# Patient Record
Sex: MALE | Race: WHITE | Employment: FULL TIME | URBAN - METROPOLITAN AREA
[De-identification: names, ages, dates, MRNs, and addresses within clinical notes are randomized per-mention and may not be internally consistent; named-entity substitution may affect disease eponyms.]

---

## 2018-09-30 ENCOUNTER — HOSPITAL ENCOUNTER (EMERGENCY)
Age: 23
Discharge: HOME | End: 2018-09-30
Attending: EMERGENCY MEDICINE
Payer: COMMERCIAL

## 2018-09-30 ENCOUNTER — APPOINTMENT (OUTPATIENT)
Dept: GENERAL RADIOLOGY | Age: 23
End: 2018-09-30
Attending: EMERGENCY MEDICINE
Payer: COMMERCIAL

## 2018-09-30 VITALS
TEMPERATURE: 99.1 F | RESPIRATION RATE: 22 BRPM | DIASTOLIC BLOOD PRESSURE: 63 MMHG | HEART RATE: 89 BPM | OXYGEN SATURATION: 96 % | SYSTOLIC BLOOD PRESSURE: 94 MMHG

## 2018-09-30 DIAGNOSIS — R07.89 ATYPICAL CHEST PAIN: Primary | ICD-10-CM

## 2018-09-30 DIAGNOSIS — R50.9 FEVER, UNSPECIFIED FEVER CAUSE: ICD-10-CM

## 2018-09-30 DIAGNOSIS — R19.7 DIARRHEA, UNSPECIFIED TYPE: ICD-10-CM

## 2018-09-30 DIAGNOSIS — B34.9 VIRAL ILLNESS: ICD-10-CM

## 2018-09-30 DIAGNOSIS — R11.2 NON-INTRACTABLE VOMITING WITH NAUSEA, UNSPECIFIED VOMITING TYPE: ICD-10-CM

## 2018-09-30 LAB
ALBUMIN SERPL-MCNC: 4.6 G/DL (ref 3.5–5)
ALBUMIN/GLOB SERPL: 1.2 {RATIO} (ref 1.1–2.2)
ALP SERPL-CCNC: 77 U/L (ref 45–117)
ALT SERPL-CCNC: 28 U/L (ref 12–78)
ANION GAP SERPL CALC-SCNC: 11 MMOL/L (ref 5–15)
AST SERPL-CCNC: 24 U/L (ref 15–37)
ATRIAL RATE: 107 BPM
BASOPHILS # BLD: 0.2 K/UL (ref 0–0.1)
BASOPHILS NFR BLD: 2 % (ref 0–1)
BILIRUB SERPL-MCNC: 0.6 MG/DL (ref 0.2–1)
BUN SERPL-MCNC: 13 MG/DL (ref 6–20)
BUN/CREAT SERPL: 12 (ref 12–20)
CALCIUM SERPL-MCNC: 9.5 MG/DL (ref 8.5–10.1)
CALCULATED P AXIS, ECG09: 82 DEGREES
CALCULATED R AXIS, ECG10: -89 DEGREES
CALCULATED T AXIS, ECG11: 66 DEGREES
CHLORIDE SERPL-SCNC: 99 MMOL/L (ref 97–108)
CO2 SERPL-SCNC: 28 MMOL/L (ref 21–32)
COMMENT, HOLDF: NORMAL
CREAT SERPL-MCNC: 1.09 MG/DL (ref 0.7–1.3)
D DIMER PPP FEU-MCNC: 0.33 MG/L FEU (ref 0–0.65)
DIAGNOSIS, 93000: NORMAL
DIFFERENTIAL METHOD BLD: ABNORMAL
EOSINOPHIL # BLD: 0 K/UL (ref 0–0.4)
EOSINOPHIL NFR BLD: 0 % (ref 0–7)
ERYTHROCYTE [DISTWIDTH] IN BLOOD BY AUTOMATED COUNT: 12.5 % (ref 11.5–14.5)
GLOBULIN SER CALC-MCNC: 3.8 G/DL (ref 2–4)
GLUCOSE SERPL-MCNC: 108 MG/DL (ref 65–100)
HCT VFR BLD AUTO: 48.1 % (ref 36.6–50.3)
HGB BLD-MCNC: 16.5 G/DL (ref 12.1–17)
IMM GRANULOCYTES # BLD: 0 K/UL
IMM GRANULOCYTES NFR BLD AUTO: 0 %
LYMPHOCYTES # BLD: 0.5 K/UL (ref 0.8–3.5)
LYMPHOCYTES NFR BLD: 5 % (ref 12–49)
MCH RBC QN AUTO: 28.3 PG (ref 26–34)
MCHC RBC AUTO-ENTMCNC: 34.3 G/DL (ref 30–36.5)
MCV RBC AUTO: 82.5 FL (ref 80–99)
MONOCYTES # BLD: 0.4 K/UL (ref 0–1)
MONOCYTES NFR BLD: 4 % (ref 5–13)
NEUTS BAND NFR BLD MANUAL: 8 % (ref 0–6)
NEUTS SEG # BLD: 9.3 K/UL (ref 1.8–8)
NEUTS SEG NFR BLD: 81 % (ref 32–75)
NRBC # BLD: 0 K/UL (ref 0–0.01)
NRBC BLD-RTO: 0 PER 100 WBC
P-R INTERVAL, ECG05: 142 MS
PLATELET # BLD AUTO: 224 K/UL (ref 150–400)
PMV BLD AUTO: 10.3 FL (ref 8.9–12.9)
POTASSIUM SERPL-SCNC: 3.7 MMOL/L (ref 3.5–5.1)
PROT SERPL-MCNC: 8.4 G/DL (ref 6.4–8.2)
Q-T INTERVAL, ECG07: 326 MS
QRS DURATION, ECG06: 106 MS
QTC CALCULATION (BEZET), ECG08: 435 MS
RBC # BLD AUTO: 5.83 M/UL (ref 4.1–5.7)
RBC MORPH BLD: ABNORMAL
SAMPLES BEING HELD,HOLD: NORMAL
SODIUM SERPL-SCNC: 138 MMOL/L (ref 136–145)
TROPONIN I SERPL-MCNC: <0.05 NG/ML
VENTRICULAR RATE, ECG03: 107 BPM
WBC # BLD AUTO: 10.4 K/UL (ref 4.1–11.1)

## 2018-09-30 PROCEDURE — 96361 HYDRATE IV INFUSION ADD-ON: CPT

## 2018-09-30 PROCEDURE — 74011250637 HC RX REV CODE- 250/637: Performed by: EMERGENCY MEDICINE

## 2018-09-30 PROCEDURE — 80053 COMPREHEN METABOLIC PANEL: CPT | Performed by: EMERGENCY MEDICINE

## 2018-09-30 PROCEDURE — 74011250636 HC RX REV CODE- 250/636: Performed by: EMERGENCY MEDICINE

## 2018-09-30 PROCEDURE — 93005 ELECTROCARDIOGRAM TRACING: CPT

## 2018-09-30 PROCEDURE — 96374 THER/PROPH/DIAG INJ IV PUSH: CPT

## 2018-09-30 PROCEDURE — 36415 COLL VENOUS BLD VENIPUNCTURE: CPT | Performed by: EMERGENCY MEDICINE

## 2018-09-30 PROCEDURE — 84484 ASSAY OF TROPONIN QUANT: CPT | Performed by: EMERGENCY MEDICINE

## 2018-09-30 PROCEDURE — 85379 FIBRIN DEGRADATION QUANT: CPT | Performed by: EMERGENCY MEDICINE

## 2018-09-30 PROCEDURE — 99285 EMERGENCY DEPT VISIT HI MDM: CPT

## 2018-09-30 PROCEDURE — 71046 X-RAY EXAM CHEST 2 VIEWS: CPT

## 2018-09-30 PROCEDURE — 85025 COMPLETE CBC W/AUTO DIFF WBC: CPT | Performed by: EMERGENCY MEDICINE

## 2018-09-30 RX ORDER — ONDANSETRON 2 MG/ML
4 INJECTION INTRAMUSCULAR; INTRAVENOUS
Status: COMPLETED | OUTPATIENT
Start: 2018-09-30 | End: 2018-09-30

## 2018-09-30 RX ORDER — ONDANSETRON 4 MG/1
4 TABLET, ORALLY DISINTEGRATING ORAL
Qty: 12 TAB | Refills: 0 | Status: SHIPPED | OUTPATIENT
Start: 2018-09-30

## 2018-09-30 RX ORDER — ACETAMINOPHEN 325 MG/1
975 TABLET ORAL
Status: COMPLETED | OUTPATIENT
Start: 2018-09-30 | End: 2018-09-30

## 2018-09-30 RX ADMIN — ONDANSETRON 4 MG: 2 INJECTION INTRAMUSCULAR; INTRAVENOUS at 04:26

## 2018-09-30 RX ADMIN — ACETAMINOPHEN 975 MG: 325 TABLET ORAL at 04:26

## 2018-09-30 RX ADMIN — SODIUM CHLORIDE 1000 ML: 900 INJECTION, SOLUTION INTRAVENOUS at 04:26

## 2018-09-30 NOTE — DISCHARGE INSTRUCTIONS
Chest Pain: Care Instructions  Your Care Instructions    There are many things that can cause chest pain. Some are not serious and will get better on their own in a few days. But some kinds of chest pain need more testing and treatment. Your doctor may have recommended a follow-up visit in the next 8 to 12 hours. If you are not getting better, you may need more tests or treatment. Even though your doctor has released you, you still need to watch for any problems. The doctor carefully checked you, but sometimes problems can develop later. If you have new symptoms or if your symptoms do not get better, get medical care right away. If you have worse or different chest pain or pressure that lasts more than 5 minutes or you passed out (lost consciousness), call 911 or seek other emergency help right away. A medical visit is only one step in your treatment. Even if you feel better, you still need to do what your doctor recommends, such as going to all suggested follow-up appointments and taking medicines exactly as directed. This will help you recover and help prevent future problems. How can you care for yourself at home? · Rest until you feel better. · Take your medicine exactly as prescribed. Call your doctor if you think you are having a problem with your medicine. · Do not drive after taking a prescription pain medicine. When should you call for help? Call 911 if:    · You passed out (lost consciousness).     · You have severe difficulty breathing.     · You have symptoms of a heart attack. These may include:  ¨ Chest pain or pressure, or a strange feeling in your chest.  ¨ Sweating. ¨ Shortness of breath. ¨ Nausea or vomiting. ¨ Pain, pressure, or a strange feeling in your back, neck, jaw, or upper belly or in one or both shoulders or arms. ¨ Lightheadedness or sudden weakness. ¨ A fast or irregular heartbeat.   After you call 911, the  may tell you to chew 1 adult-strength or 2 to 4 low-dose aspirin. Wait for an ambulance. Do not try to drive yourself.    Call your doctor today if:    · You have any trouble breathing.     · Your chest pain gets worse.     · You are dizzy or lightheaded, or you feel like you may faint.     · You are not getting better as expected.     · You are having new or different chest pain. Where can you learn more? Go to http://grzegorz-trino.info/. Enter A120 in the search box to learn more about \"Chest Pain: Care Instructions. \"  Current as of: November 20, 2017  Content Version: 11.7  © 7812-6376 [employer]. Care instructions adapted under license by [employer] (which disclaims liability or warranty for this information). If you have questions about a medical condition or this instruction, always ask your healthcare professional. Jeff Li any warranty or liability for your use of this information. Diarrhea: Care Instructions  Your Care Instructions    Diarrhea is loose, watery stools (bowel movements). The exact cause is often hard to find. Sometimes diarrhea is your body's way of getting rid of what caused an upset stomach. Viruses, food poisoning, and many medicines can cause diarrhea. Some people get diarrhea in response to emotional stress, anxiety, or certain foods. Almost everyone has diarrhea now and then. It usually isn't serious, and your stools will return to normal soon. The important thing to do is replace the fluids you have lost, so you can prevent dehydration. The doctor has checked you carefully, but problems can develop later. If you notice any problems or new symptoms, get medical treatment right away. Follow-up care is a key part of your treatment and safety. Be sure to make and go to all appointments, and call your doctor if you are having problems. It's also a good idea to know your test results and keep a list of the medicines you take.   How can you care for yourself at home? · Watch for signs of dehydration, which means your body has lost too much water. Dehydration is a serious condition and should be treated right away. Signs of dehydration are:  ¨ Increasing thirst and dry eyes and mouth. ¨ Feeling faint or lightheaded. ¨ Darker urine, and a smaller amount of urine than normal.  · To prevent dehydration, drink plenty of fluids, enough so that your urine is light yellow or clear like water. Choose water and other caffeine-free clear liquids until you feel better. If you have kidney, heart, or liver disease and have to limit fluids, talk with your doctor before you increase the amount of fluids you drink. · Begin eating small amounts of mild foods the next day, if you feel like it. ¨ Try yogurt that has live cultures of Lactobacillus. (Check the label.)  ¨ Avoid spicy foods, fruits, alcohol, and caffeine until 48 hours after all symptoms are gone. ¨ Avoid chewing gum that contains sorbitol. ¨ Avoid dairy products (except for yogurt with Lactobacillus) while you have diarrhea and for 3 days after symptoms are gone. · The doctor may recommend that you take over-the-counter medicine, such as loperamide (Imodium), if you still have diarrhea after 6 hours. Read and follow all instructions on the label. Do not use this medicine if you have bloody diarrhea, a high fever, or other signs of serious illness. Call your doctor if you think you are having a problem with your medicine. When should you call for help? Call 911 anytime you think you may need emergency care.  For example, call if:    · You passed out (lost consciousness).     · Your stools are maroon or very bloody.    Call your doctor now or seek immediate medical care if:    · You are dizzy or lightheaded, or you feel like you may faint.     · Your stools are black and look like tar, or they have streaks of blood.     · You have new or worse belly pain.     · You have symptoms of dehydration, such as:  ¨ Dry eyes and a dry mouth. ¨ Passing only a little dark urine. ¨ Feeling thirstier than usual.     · You have a new or higher fever.    Watch closely for changes in your health, and be sure to contact your doctor if:    · Your diarrhea is getting worse.     · You see pus in the diarrhea.     · You are not getting better after 2 days (48 hours). Where can you learn more? Go to http://grzegorz-trino.info/. Enter Y992 in the search box to learn more about \"Diarrhea: Care Instructions. \"  Current as of: November 20, 2017  Content Version: 11.7  © 0620-2421 [employer]. Care instructions adapted under license by [email or web address] (which disclaims liability or warranty for this information). If you have questions about a medical condition or this instruction, always ask your healthcare professional. [address] any warranty or liability for your use of this information. Learning About Fever  What is a fever? A fever is a high body temperature. It's one way your body fights being sick. A fever shows that the body is responding to infection or other illnesses, both minor and severe. A fever is a symptom, not an illness by itself. A fever can be a sign that you are ill, but most fevers are not caused by a serious problem. You may have a fever with a minor illness, such as a cold. But sometimes a very serious infection may cause little or no fever. It is important to look at other symptoms, other conditions you have, and how you feel in general. In children, notice how they act and see what symptoms they complain of. What is a normal body temperature? A normal body temperature is about 98. 6ºF. Some people have a normal temperature that is a little higher or a little lower than this. Your temperature may be a little lower in the morning than it is later in the day.  It may go up during hot weather or when you exercise, wear heavy clothes, or take a hot bath.  Your temperature may also be different depending on how you take it. A temperature taken in the mouth (oral) or under the arm may be a little lower than your core temperature (rectal). What is a fever temperature? A core temperature of 100.4°F or above is considered a fever. What can cause a fever? A fever may be caused by:  · Infections. This is the most common cause of a fever. Examples of infections that can cause a fever include the flu, a kidney infection, or pneumonia. · Some medicines. · Severe trauma or injury, such as a heart attack, stroke, heatstroke, or burns. · Other medical conditions, such as arthritis and some cancers. How can you treat a fever at home? · Ask your doctor if you can take an over-the-counter pain medicine, such as acetaminophen (Tylenol), ibuprofen (Advil, Motrin), or naproxen (Aleve). Be safe with medicines. Read and follow all instructions on the label. · To prevent dehydration, drink plenty of fluids. Choose water and other caffeine-free clear liquids until you feel better. If you have kidney, heart, or liver disease and have to limit fluids, talk with your doctor before you increase the amount of fluids you drink. Follow-up care is a key part of your treatment and safety. Be sure to make and go to all appointments, and call your doctor if you are having problems. It's also a good idea to know your test results and keep a list of the medicines you take. Where can you learn more? Go to http://grzegorz-trino.info/. Enter A948 in the search box to learn more about \"Learning About Fever. \"  Current as of: November 20, 2017  Content Version: 11.7  © 2761-0030 [employer]. Care instructions adapted under license by [facility] (which disclaims liability or warranty for this information).  If you have questions about a medical condition or this instruction, always ask your healthcare professional. Mauri Rosas disclaims any warranty or liability for your use of this information. Nausea and Vomiting: Care Instructions  Your Care Instructions    When you are nauseated, you may feel weak and sweaty and notice a lot of saliva in your mouth. Nausea often leads to vomiting. Most of the time you do not need to worry about nausea and vomiting, but they can be signs of other illnesses. Two common causes of nausea and vomiting are stomach flu and food poisoning. Nausea and vomiting from viral stomach flu will usually start to improve within 24 hours. Nausea and vomiting from food poisoning may last from 12 to 48 hours. The doctor has checked you carefully, but problems can develop later. If you notice any problems or new symptoms, get medical treatment right away. Follow-up care is a key part of your treatment and safety. Be sure to make and go to all appointments, and call your doctor if you are having problems. It's also a good idea to know your test results and keep a list of the medicines you take. How can you care for yourself at home? · To prevent dehydration, drink plenty of fluids, enough so that your urine is light yellow or clear like water. Choose water and other caffeine-free clear liquids until you feel better. If you have kidney, heart, or liver disease and have to limit fluids, talk with your doctor before you increase the amount of fluids you drink. · Rest in bed until you feel better. · When you are able to eat, try clear soups, mild foods, and liquids until all symptoms are gone for 12 to 48 hours. Other good choices include dry toast, crackers, cooked cereal, and gelatin dessert, such as Jell-O. When should you call for help? Call 911 anytime you think you may need emergency care. For example, call if:    · You passed out (lost consciousness).    Call your doctor now or seek immediate medical care if:    · You have symptoms of dehydration, such as:  ¨ Dry eyes and a dry mouth.   ¨ Passing only a little dark urine. ¨ Feeling thirstier than usual.     · You have new or worsening belly pain.     · You have a new or higher fever.     · You vomit blood or what looks like coffee grounds.    Watch closely for changes in your health, and be sure to contact your doctor if:    · You have ongoing nausea and vomiting.     · Your vomiting is getting worse.     · Your vomiting lasts longer than 2 days.     · You are not getting better as expected. Where can you learn more? Go to http://grzegorz-trino.info/. Enter 25 [id number] in the search box to learn more about \"Nausea and Vomiting: Care Instructions. \"  Current as of: November 20, 2017  Content Version: 11.7  © 7238-4429 [employer]. Care instructions adapted under license by [employer] (which disclaims liability or warranty for this information). If you have questions about a medical condition or this instruction, always ask your healthcare professional. [place] [address] any warranty or liability for your use of this information. Viral Infections: Care Instructions  Your Care Instructions    You don't feel well, but it's not clear what's causing it. You may have a viral infection. Viruses cause many illnesses, such as the common cold, influenza, fever, rashes, and the diarrhea, nausea, and vomiting that are often called \"stomach flu. \" You may wonder if antibiotic medicines could make you feel better. But antibiotics only treat infections caused by bacteria. They don't work on viruses. The good news is that viral infections usually aren't serious. Most will go away in a few days without medical treatment. In the meantime, there are a few things you can do to make yourself more comfortable. Follow-up care is a key part of your treatment and safety. Be sure to make and go to all appointments, and call your doctor if you are having problems.  It's also a good idea to know your test results and keep a list of the medicines you take. How can you care for yourself at home? · Get plenty of rest if you feel tired. · Take an over-the-counter pain medicine if needed, such as acetaminophen (Tylenol), ibuprofen (Advil, Motrin), or naproxen (Aleve). Read and follow all instructions on the label. · Be careful when taking over-the-counter cold or flu medicines and Tylenol at the same time. Many of these medicines have acetaminophen, which is Tylenol. Read the labels to make sure that you are not taking more than the recommended dose. Too much acetaminophen (Tylenol) can be harmful. · Drink plenty of fluids, enough so that your urine is light yellow or clear like water. If you have kidney, heart, or liver disease and have to limit fluids, talk with your doctor before you increase the amount of fluids you drink. · Stay home from work, school, and other public places while you have a fever. When should you call for help? Call 911 anytime you think you may need emergency care. For example, call if:    · You have severe trouble breathing.     · You passed out (lost consciousness).    Call your doctor now or seek immediate medical care if:    · You seem to be getting much sicker.     · You have a new or higher fever.     · You have blood in your stools.     · You have new belly pain, or your pain gets worse.     · You have a new rash.    Watch closely for changes in your health, and be sure to contact your doctor if:    · You start to get better and then get worse.     · You do not get better as expected. Where can you learn more? Go to http://grzegorz-trino.info/. Enter N032 in the search box to learn more about \"Viral Infections: Care Instructions. \"  Current as of: November 18, 2017  Content Version: 11.7  © 6164-1020 [employer]. Care instructions adapted under license by [employer] (which disclaims liability or warranty for this information).  If you have questions about a medical condition or this instruction, always ask your healthcare professional. [place] [address] any warranty or liability for your use of this information.

## 2018-09-30 NOTE — ED NOTES
Patient is discharged home. Alert, oriented, and ambulatory. Patient is in no distress. Education given regarding follow up and medication. Patient verbalizes understanding.

## 2018-09-30 NOTE — ED PROVIDER NOTES
HPI Comments: 19 yo male presenting to the emergency department this evening with acute onset of left-sided chest pain associated with shortness of breath. In addition earlier today the patient felt some chills had some epigastric discomfort and several episodes of dry heaves as well as vomiting. Positive diarrhea. No UTI symptoms. There has been no cough or sputum production. No travel. He used Imitrex for migraine headache at around noon today and also took 2 vitamin D tablets. According to his father and the patient - earlier this week he had a sore throat and was taken to his PCP or a strep screen was negative. The patient is otherwise in good health. Patient is a 20 y.o. male presenting with shortness of breath. Shortness of Breath Pertinent negatives include no fever, no headaches, no rhinorrhea, no sore throat, no cough, no wheezing, no chest pain, no vomiting, no abdominal pain, no rash and no leg swelling. No past medical history on file. No past surgical history on file. No family history on file. Social History Social History  Marital status: N/A Spouse name: N/A  
 Number of children: N/A  
 Years of education: N/A Occupational History  Not on file. Social History Main Topics  Smoking status: Never Smoker  Smokeless tobacco: Never Used  Alcohol use Not on file  Drug use: Not on file  Sexual activity: Not on file Other Topics Concern  Not on file Social History Narrative  No narrative on file ALLERGIES: Review of patient's allergies indicates no known allergies. Review of Systems Constitutional: Negative for chills, diaphoresis and fever. HENT: Negative for congestion, postnasal drip, rhinorrhea and sore throat. Eyes: Negative for photophobia, discharge, redness and visual disturbance. Respiratory: Positive for shortness of breath. Negative for cough, chest tightness and wheezing. Cardiovascular: Negative for chest pain, palpitations and leg swelling. Gastrointestinal: Negative for abdominal distention, abdominal pain, blood in stool, constipation, diarrhea, nausea and vomiting. Genitourinary: Negative for difficulty urinating, dysuria, frequency, hematuria and urgency. Musculoskeletal: Negative for arthralgias, back pain, joint swelling and myalgias. Skin: Negative for color change and rash. Neurological: Negative for dizziness, speech difficulty, weakness, light-headedness, numbness and headaches. Psychiatric/Behavioral: Negative for confusion. The patient is not nervous/anxious. All other systems reviewed and are negative. Vitals:  
 09/30/18 0411 BP: 106/74 Pulse: (!) 105 Resp: 22 Temp: (!) 101 °F (38.3 °C) SpO2: 96% Physical Exam  
Constitutional: He is oriented to person, place, and time. He appears well-developed and well-nourished. No distress. Pale appearing HENT:  
Head: Normocephalic and atraumatic. Right Ear: External ear normal.  
Left Ear: External ear normal.  
Nose: Nose normal.  
Mouth/Throat: Oropharynx is clear and moist.  
Eyes: Conjunctivae and EOM are normal. Pupils are equal, round, and reactive to light. No scleral icterus. Neck: Normal range of motion. Neck supple. No JVD present. No tracheal deviation present. No thyromegaly present. Cardiovascular: Normal rate, regular rhythm and normal heart sounds. Exam reveals no gallop and no friction rub. No murmur heard. Pulmonary/Chest: Effort normal and breath sounds normal. No respiratory distress. He has no wheezes. He has no rales. He exhibits no tenderness. Abdominal: Soft. Bowel sounds are normal. He exhibits no distension and no mass. There is no tenderness. There is no rebound and no guarding. Musculoskeletal: Normal range of motion. He exhibits no edema or tenderness. Lymphadenopathy:  
  He has no cervical adenopathy. Neurological: He is alert and oriented to person, place, and time. He has normal strength. He displays no atrophy and no tremor. No cranial nerve deficit. He exhibits normal muscle tone. Coordination and gait normal.  
Skin: Skin is warm and dry. No rash noted. He is not diaphoretic. No erythema. Psychiatric: He has a normal mood and affect. His behavior is normal. Judgment and thought content normal.  
Nursing note and vitals reviewed. MDM Number of Diagnoses or Management Options Diagnosis management comments: Impression is one of a 26-year-old male presenting with a history of a sore throat earlier in the week now having fevers and chills associated with diarrhea nausea vomiting as well as left-sided chest discomfort and shortness of breath all occurring after he vomited. No hematemesis no crunch heard during chest auscultation, ductus represents esophageal rupture or Cammie-Alcantara tear. Consider all secondary to likely a viral syndrome with nausea vomiting and diarrhea as well as a fever chest pain shortness of breath likely all secondary due to the vomiting. Plan of care we baseline labs included chest x-ray, will provide IV fluids acetaminophen, normal saline and Zofran p.r.n. ED Course Procedures Patient's EKG reveals sinus tachycardia rate of 107 he is an incomplete right bundle branch block as well as left anterior hemiblock.

## 2021-04-14 ENCOUNTER — TELEPHONE (OUTPATIENT)
Dept: SCHEDULING | Age: 26
End: 2021-04-14

## 2021-04-20 ENCOUNTER — TELEPHONE (OUTPATIENT)
Dept: SCHEDULING | Age: 26
End: 2021-04-20

## 2021-05-04 ENCOUNTER — LAB SERVICES (OUTPATIENT)
Dept: LAB | Age: 26
End: 2021-05-04

## 2021-05-04 ENCOUNTER — OFFICE VISIT (OUTPATIENT)
Dept: INTERNAL MEDICINE | Age: 26
End: 2021-05-04

## 2021-05-04 DIAGNOSIS — L67.9 HAIR CHANGES: ICD-10-CM

## 2021-05-04 DIAGNOSIS — R06.89 DIFFICULTY BREATHING: ICD-10-CM

## 2021-05-04 DIAGNOSIS — Z11.59 SCREENING FOR VIRAL DISEASE: ICD-10-CM

## 2021-05-04 DIAGNOSIS — Z00.00 ANNUAL PHYSICAL EXAM: ICD-10-CM

## 2021-05-04 DIAGNOSIS — E55.9 VITAMIN D DEFICIENCY: ICD-10-CM

## 2021-05-04 DIAGNOSIS — Q67.6 PECTUS EXCAVATUM: ICD-10-CM

## 2021-05-04 DIAGNOSIS — Z00.00 ANNUAL PHYSICAL EXAM: Primary | ICD-10-CM

## 2021-05-04 DIAGNOSIS — R63.6 LOW WEIGHT: ICD-10-CM

## 2021-05-04 DIAGNOSIS — R22.30 NODULE OF SKIN OF HAND: ICD-10-CM

## 2021-05-04 DIAGNOSIS — Z87.19 HISTORY OF RECTAL ABSCESS: ICD-10-CM

## 2021-05-04 DIAGNOSIS — Z87.19 HISTORY OF HEMORRHOIDS: ICD-10-CM

## 2021-05-04 PROCEDURE — 86696 HERPES SIMPLEX TYPE 2 TEST: CPT | Performed by: INTERNAL MEDICINE

## 2021-05-04 PROCEDURE — 80050 GENERAL HEALTH PANEL: CPT | Performed by: INTERNAL MEDICINE

## 2021-05-04 PROCEDURE — 80061 LIPID PANEL: CPT | Performed by: INTERNAL MEDICINE

## 2021-05-04 PROCEDURE — 82607 VITAMIN B-12: CPT | Performed by: INTERNAL MEDICINE

## 2021-05-04 PROCEDURE — 82306 VITAMIN D 25 HYDROXY: CPT | Performed by: INTERNAL MEDICINE

## 2021-05-04 PROCEDURE — 36415 COLL VENOUS BLD VENIPUNCTURE: CPT | Performed by: INTERNAL MEDICINE

## 2021-05-04 PROCEDURE — 81001 URINALYSIS AUTO W/SCOPE: CPT | Performed by: INTERNAL MEDICINE

## 2021-05-04 PROCEDURE — 99385 PREV VISIT NEW AGE 18-39: CPT | Performed by: INTERNAL MEDICINE

## 2021-05-04 PROCEDURE — 86695 HERPES SIMPLEX TYPE 1 TEST: CPT | Performed by: INTERNAL MEDICINE

## 2021-05-04 RX ORDER — EMTRICITABINE AND TENOFOVIR DISOPROXIL FUMARATE 200; 300 MG/1; MG/1
TABLET, FILM COATED ORAL
COMMUNITY
Start: 2021-04-01

## 2021-05-04 RX ORDER — SUMATRIPTAN 100 MG/1
TABLET, FILM COATED ORAL
COMMUNITY
Start: 2021-04-01

## 2021-05-04 ASSESSMENT — ENCOUNTER SYMPTOMS
CHEST TIGHTNESS: 0
SHORTNESS OF BREATH: 0
COUGH: 0
NUMBNESS: 0
SINUS PAIN: 0
EYE PAIN: 0
DIARRHEA: 0
UNEXPECTED WEIGHT CHANGE: 0
ACTIVITY CHANGE: 0
SINUS PRESSURE: 0
ABDOMINAL PAIN: 0
BLOOD IN STOOL: 0
EYE ITCHING: 0
RHINORRHEA: 0
TROUBLE SWALLOWING: 0
WEAKNESS: 0
BACK PAIN: 0
CONSTIPATION: 0
FATIGUE: 0
NERVOUS/ANXIOUS: 0
VOICE CHANGE: 0
EYE REDNESS: 0
POLYPHAGIA: 0
POLYDIPSIA: 0

## 2021-05-04 ASSESSMENT — PATIENT HEALTH QUESTIONNAIRE - PHQ9
SUM OF ALL RESPONSES TO PHQ9 QUESTIONS 1 AND 2: 0
2. FEELING DOWN, DEPRESSED OR HOPELESS: NOT AT ALL
1. LITTLE INTEREST OR PLEASURE IN DOING THINGS: NOT AT ALL
CLINICAL INTERPRETATION OF PHQ2 SCORE: NO FURTHER SCREENING NEEDED
SUM OF ALL RESPONSES TO PHQ9 QUESTIONS 1 AND 2: 0
CLINICAL INTERPRETATION OF PHQ9 SCORE: NO FURTHER SCREENING NEEDED

## 2021-05-04 ASSESSMENT — PAIN SCALES - GENERAL: PAINLEVEL: 1-2

## 2021-05-05 LAB
25(OH)D3+25(OH)D2 SERPL-MCNC: 20.2 NG/ML (ref 30–100)
ALBUMIN SERPL-MCNC: 4.5 G/DL (ref 3.6–5.1)
ALBUMIN/GLOB SERPL: 1.3 {RATIO} (ref 1–2.4)
ALP SERPL-CCNC: 59 UNITS/L (ref 45–117)
ALT SERPL-CCNC: 15 UNITS/L
ANION GAP SERPL CALC-SCNC: 10 MMOL/L (ref 10–20)
APPEARANCE UR: ABNORMAL
AST SERPL-CCNC: 18 UNITS/L
BACTERIA #/AREA URNS HPF: ABNORMAL /HPF
BASOPHILS # BLD: 0 K/MCL (ref 0–0.3)
BASOPHILS NFR BLD: 1 %
BILIRUB SERPL-MCNC: 0.7 MG/DL (ref 0.2–1)
BILIRUB UR QL STRIP: NEGATIVE
BUN SERPL-MCNC: 12 MG/DL (ref 6–20)
BUN/CREAT SERPL: 13 (ref 7–25)
CALCIUM SERPL-MCNC: 9.3 MG/DL (ref 8.4–10.2)
CHLORIDE SERPL-SCNC: 105 MMOL/L (ref 98–107)
CHOLEST SERPL-MCNC: 167 MG/DL
CHOLEST/HDLC SERPL: 2.4 {RATIO}
CO2 SERPL-SCNC: 28 MMOL/L (ref 21–32)
COLOR UR: YELLOW
CREAT SERPL-MCNC: 0.96 MG/DL (ref 0.67–1.17)
DEPRECATED RDW RBC: 41.5 FL (ref 39–50)
EOSINOPHIL # BLD: 0.1 K/MCL (ref 0–0.5)
EOSINOPHIL NFR BLD: 1 %
ERYTHROCYTE [DISTWIDTH] IN BLOOD: 12.8 % (ref 11–15)
FASTING DURATION TIME PATIENT: NORMAL H
FASTING DURATION TIME PATIENT: NORMAL H
GFR SERPLBLD BASED ON 1.73 SQ M-ARVRAT: >90 ML/MIN/1.73M2
GLOBULIN SER-MCNC: 3.5 G/DL (ref 2–4)
GLUCOSE SERPL-MCNC: 82 MG/DL (ref 65–99)
GLUCOSE UR STRIP-MCNC: NEGATIVE MG/DL
HCT VFR BLD CALC: 44.4 % (ref 39–51)
HDLC SERPL-MCNC: 70 MG/DL
HGB BLD-MCNC: 14.8 G/DL (ref 13–17)
HGB UR QL STRIP: NEGATIVE
HYALINE CASTS #/AREA URNS LPF: ABNORMAL /LPF
IMM GRANULOCYTES # BLD AUTO: 0 K/MCL (ref 0–0.2)
IMM GRANULOCYTES # BLD: 0 %
KETONES UR STRIP-MCNC: NEGATIVE MG/DL
LDLC SERPL CALC-MCNC: 88 MG/DL
LEUKOCYTE ESTERASE UR QL STRIP: NEGATIVE
LYMPHOCYTES # BLD: 1.7 K/MCL (ref 1–4.8)
LYMPHOCYTES NFR BLD: 34 %
MCH RBC QN AUTO: 29.4 PG (ref 26–34)
MCHC RBC AUTO-ENTMCNC: 33.3 G/DL (ref 32–36.5)
MCV RBC AUTO: 88.1 FL (ref 78–100)
MONOCYTES # BLD: 0.6 K/MCL (ref 0.3–0.9)
MONOCYTES NFR BLD: 11 %
MUCOUS THREADS URNS QL MICRO: PRESENT
NEUTROPHILS # BLD: 2.6 K/MCL (ref 1.8–7.7)
NEUTROPHILS NFR BLD: 53 %
NITRITE UR QL STRIP: NEGATIVE
NONHDLC SERPL-MCNC: 97 MG/DL
NRBC BLD MANUAL-RTO: 0 /100 WBC
PH UR STRIP: 6 [PH] (ref 5–7)
PLATELET # BLD AUTO: 217 K/MCL (ref 140–450)
POTASSIUM SERPL-SCNC: 4.3 MMOL/L (ref 3.4–5.1)
PROT SERPL-MCNC: 8 G/DL (ref 6.4–8.2)
PROT UR STRIP-MCNC: NEGATIVE MG/DL
RBC # BLD: 5.04 MIL/MCL (ref 4.5–5.9)
RBC #/AREA URNS HPF: ABNORMAL /HPF
SODIUM SERPL-SCNC: 139 MMOL/L (ref 135–145)
SP GR UR STRIP: 1.02 (ref 1–1.03)
SQUAMOUS #/AREA URNS HPF: ABNORMAL /HPF
TRIGL SERPL-MCNC: 44 MG/DL
TSH SERPL-ACNC: 3.54 MCUNITS/ML (ref 0.35–5)
UROBILINOGEN UR STRIP-MCNC: 0.2 MG/DL
VIT B12 SERPL-MCNC: 485 PG/ML (ref 211–911)
WBC # BLD: 4.9 K/MCL (ref 4.2–11)
WBC #/AREA URNS HPF: ABNORMAL /HPF

## 2021-05-06 LAB
HSV1 IGG SERPL QL IA: POSITIVE
HSV2 IGG SERPL QL IA: NEGATIVE

## 2021-05-26 VITALS
BODY MASS INDEX: 17.03 KG/M2 | DIASTOLIC BLOOD PRESSURE: 62 MMHG | WEIGHT: 128.53 LBS | HEIGHT: 73 IN | HEART RATE: 72 BPM | OXYGEN SATURATION: 99 % | SYSTOLIC BLOOD PRESSURE: 97 MMHG | RESPIRATION RATE: 17 BRPM

## 2025-08-13 ENCOUNTER — HOSPITAL ENCOUNTER (INPATIENT)
Age: 30
LOS: 1 days | Discharge: HOME | DRG: 395 | End: 2025-08-14
Attending: EMERGENCY MEDICINE | Admitting: SURGERY

## 2025-08-13 ENCOUNTER — APPOINTMENT (OUTPATIENT)
Dept: CT IMAGING | Age: 30
DRG: 395 | End: 2025-08-13
Attending: EMERGENCY MEDICINE

## 2025-08-13 DIAGNOSIS — K61.0 PERIANAL ABSCESS: Primary | ICD-10-CM

## 2025-08-13 LAB
ABO + RH BLD: NORMAL
ALBUMIN SERPL-MCNC: 4.8 G/DL (ref 3.4–5)
ALBUMIN/GLOB SERPL: 1.4 {RATIO} (ref 1–2.4)
ALP SERPL-CCNC: 69 UNITS/L (ref 45–117)
ALT SERPL-CCNC: 12 UNITS/L
ANION GAP SERPL CALC-SCNC: 13 MMOL/L (ref 7–19)
ANION GAP SERPL CALC-SCNC: 16 MMOL/L (ref 7–19)
APTT PPP: 30 SEC (ref 22–32)
AST SERPL-CCNC: 22 UNITS/L
BASOPHILS # BLD: 0 K/MCL (ref 0–0.3)
BASOPHILS # BLD: 0 K/MCL (ref 0–0.3)
BASOPHILS NFR BLD: 0 %
BASOPHILS NFR BLD: 0 %
BILIRUB SERPL-MCNC: 0.5 MG/DL (ref 0.2–1)
BLD GP AB SCN SERPL QL GEL: NEGATIVE
BUN SERPL-MCNC: 10 MG/DL (ref 6–20)
BUN SERPL-MCNC: 13 MG/DL (ref 6–20)
BUN/CREAT SERPL: 12 (ref 7–25)
BUN/CREAT SERPL: 16 (ref 7–25)
CALCIUM SERPL-MCNC: 8.8 MG/DL (ref 8.4–10.2)
CALCIUM SERPL-MCNC: 9.7 MG/DL (ref 8.4–10.2)
CHLORIDE SERPL-SCNC: 101 MMOL/L (ref 97–110)
CHLORIDE SERPL-SCNC: 99 MMOL/L (ref 97–110)
CO2 SERPL-SCNC: 27 MMOL/L (ref 21–32)
CO2 SERPL-SCNC: 30 MMOL/L (ref 21–32)
CREAT SERPL-MCNC: 0.83 MG/DL (ref 0.67–1.17)
CREAT SERPL-MCNC: 0.83 MG/DL (ref 0.67–1.17)
DEPRECATED RDW RBC: 40.8 FL (ref 39–50)
DEPRECATED RDW RBC: 41.9 FL (ref 39–50)
EGFRCR SERPLBLD CKD-EPI 2021: >90 ML/MIN/{1.73_M2}
EGFRCR SERPLBLD CKD-EPI 2021: >90 ML/MIN/{1.73_M2}
EOSINOPHIL # BLD: 0.1 K/MCL (ref 0–0.5)
EOSINOPHIL # BLD: 0.1 K/MCL (ref 0–0.5)
EOSINOPHIL NFR BLD: 1 %
EOSINOPHIL NFR BLD: 1 %
ERYTHROCYTE [DISTWIDTH] IN BLOOD: 12.7 % (ref 11–15)
ERYTHROCYTE [DISTWIDTH] IN BLOOD: 12.9 % (ref 11–15)
FASTING DURATION TIME PATIENT: ABNORMAL H
FASTING DURATION TIME PATIENT: NORMAL H
GLOBULIN SER-MCNC: 3.4 G/DL (ref 2–4)
GLUCOSE SERPL-MCNC: 100 MG/DL (ref 70–99)
GLUCOSE SERPL-MCNC: 96 MG/DL (ref 70–99)
HCT VFR BLD CALC: 41.4 % (ref 39–51)
HCT VFR BLD CALC: 45.8 % (ref 39–51)
HGB BLD-MCNC: 14.1 G/DL (ref 13–17)
HGB BLD-MCNC: 15.2 G/DL (ref 13–17)
IMM GRANULOCYTES # BLD AUTO: 0 K/MCL (ref 0–0.2)
IMM GRANULOCYTES # BLD AUTO: 0 K/MCL (ref 0–0.2)
IMM GRANULOCYTES # BLD: 0 %
IMM GRANULOCYTES # BLD: 0 %
INR PPP: 1.1
LACTATE BLDV-SCNC: 1.7 MMOL/L (ref 0–2)
LYMPHOCYTES # BLD: 1.6 K/MCL (ref 1–4.8)
LYMPHOCYTES # BLD: 1.8 K/MCL (ref 1–4.8)
LYMPHOCYTES NFR BLD: 17 %
LYMPHOCYTES NFR BLD: 21 %
MCH RBC QN AUTO: 29.3 PG (ref 26–34)
MCH RBC QN AUTO: 29.8 PG (ref 26–34)
MCHC RBC AUTO-ENTMCNC: 33.2 G/DL (ref 32–36.5)
MCHC RBC AUTO-ENTMCNC: 34.1 G/DL (ref 32–36.5)
MCV RBC AUTO: 87.5 FL (ref 78–100)
MCV RBC AUTO: 88.2 FL (ref 78–100)
MONOCYTES # BLD: 0.9 K/MCL (ref 0.3–0.9)
MONOCYTES # BLD: 1.1 K/MCL (ref 0.3–0.9)
MONOCYTES NFR BLD: 11 %
MONOCYTES NFR BLD: 12 %
NEUTROPHILS # BLD: 5.6 K/MCL (ref 1.8–7.7)
NEUTROPHILS # BLD: 6.2 K/MCL (ref 1.8–7.7)
NEUTROPHILS NFR BLD: 67 %
NEUTROPHILS NFR BLD: 70 %
NRBC BLD MANUAL-RTO: 0 /100 WBC
NRBC BLD MANUAL-RTO: 0 /100 WBC
PLATELET # BLD AUTO: 230 K/MCL (ref 140–450)
PLATELET # BLD AUTO: 272 K/MCL (ref 140–450)
POTASSIUM SERPL-SCNC: 3.7 MMOL/L (ref 3.4–5.1)
POTASSIUM SERPL-SCNC: 4.1 MMOL/L (ref 3.4–5.1)
PROT SERPL-MCNC: 8.2 G/DL (ref 6.4–8.2)
PROTHROMBIN TIME: 11.6 SEC (ref 9.7–11.8)
RBC # BLD: 4.73 MIL/MCL (ref 4.5–5.9)
RBC # BLD: 5.19 MIL/MCL (ref 4.5–5.9)
SODIUM SERPL-SCNC: 138 MMOL/L (ref 135–145)
SODIUM SERPL-SCNC: 140 MMOL/L (ref 135–145)
TYPE AND SCREEN EXPIRATION DATE: NORMAL
WBC # BLD: 8.4 K/MCL (ref 4.2–11)
WBC # BLD: 9 K/MCL (ref 4.2–11)

## 2025-08-13 PROCEDURE — 10002807 HB RX 258

## 2025-08-13 PROCEDURE — 85610 PROTHROMBIN TIME: CPT | Performed by: EMERGENCY MEDICINE

## 2025-08-13 PROCEDURE — 86850 RBC ANTIBODY SCREEN: CPT | Performed by: EMERGENCY MEDICINE

## 2025-08-13 PROCEDURE — 99284 EMERGENCY DEPT VISIT MOD MDM: CPT | Performed by: EMERGENCY MEDICINE

## 2025-08-13 PROCEDURE — 74177 CT ABD & PELVIS W/CONTRAST: CPT

## 2025-08-13 PROCEDURE — 83605 ASSAY OF LACTIC ACID: CPT | Performed by: EMERGENCY MEDICINE

## 2025-08-13 PROCEDURE — 10004651 HB RX, NO CHARGE ITEM

## 2025-08-13 PROCEDURE — 10002800 HB RX 250 W HCPCS

## 2025-08-13 PROCEDURE — 96376 TX/PRO/DX INJ SAME DRUG ADON: CPT

## 2025-08-13 PROCEDURE — 10002800 HB RX 250 W HCPCS: Performed by: EMERGENCY MEDICINE

## 2025-08-13 PROCEDURE — 10002803 HB RX 637

## 2025-08-13 PROCEDURE — 96361 HYDRATE IV INFUSION ADD-ON: CPT

## 2025-08-13 PROCEDURE — 99285 EMERGENCY DEPT VISIT HI MDM: CPT | Performed by: EMERGENCY MEDICINE

## 2025-08-13 PROCEDURE — 80053 COMPREHEN METABOLIC PANEL: CPT | Performed by: EMERGENCY MEDICINE

## 2025-08-13 PROCEDURE — 80048 BASIC METABOLIC PNL TOTAL CA: CPT

## 2025-08-13 PROCEDURE — 10002805 HB CONTRAST AGENT: Performed by: EMERGENCY MEDICINE

## 2025-08-13 PROCEDURE — 36415 COLL VENOUS BLD VENIPUNCTURE: CPT

## 2025-08-13 PROCEDURE — 10000002 HB ROOM CHARGE MED SURG

## 2025-08-13 PROCEDURE — 85730 THROMBOPLASTIN TIME PARTIAL: CPT | Performed by: EMERGENCY MEDICINE

## 2025-08-13 PROCEDURE — 87040 BLOOD CULTURE FOR BACTERIA: CPT | Performed by: EMERGENCY MEDICINE

## 2025-08-13 PROCEDURE — 85025 COMPLETE CBC W/AUTO DIFF WBC: CPT | Performed by: EMERGENCY MEDICINE

## 2025-08-13 PROCEDURE — 10002807 HB RX 258: Performed by: EMERGENCY MEDICINE

## 2025-08-13 PROCEDURE — 85025 COMPLETE CBC W/AUTO DIFF WBC: CPT

## 2025-08-13 PROCEDURE — 96374 THER/PROPH/DIAG INJ IV PUSH: CPT

## 2025-08-13 PROCEDURE — A4216 STERILE WATER/SALINE, 10 ML: HCPCS

## 2025-08-13 RX ORDER — 0.9 % SODIUM CHLORIDE 0.9 %
2 VIAL (ML) INJECTION EVERY 12 HOURS SCHEDULED
Status: DISCONTINUED | OUTPATIENT
Start: 2025-08-13 | End: 2025-08-14 | Stop reason: HOSPADM

## 2025-08-13 RX ORDER — AMITRIPTYLINE HYDROCHLORIDE 10 MG/1
10 TABLET ORAL DAILY
Status: ON HOLD | COMMUNITY
End: 2025-08-14

## 2025-08-13 RX ORDER — 0.9 % SODIUM CHLORIDE 0.9 %
10 VIAL (ML) INJECTION PRN
Status: DISCONTINUED | OUTPATIENT
Start: 2025-08-13 | End: 2025-08-14 | Stop reason: HOSPADM

## 2025-08-13 RX ORDER — CIPROFLOXACIN 2 MG/ML
400 INJECTION, SOLUTION INTRAVENOUS
Status: DISCONTINUED | OUTPATIENT
Start: 2025-08-13 | End: 2025-08-14

## 2025-08-13 RX ORDER — SODIUM CHLORIDE 9 MG/ML
INJECTION, SOLUTION INTRAVENOUS CONTINUOUS
Status: DISCONTINUED | OUTPATIENT
Start: 2025-08-14 | End: 2025-08-14

## 2025-08-13 RX ORDER — ACETAMINOPHEN 500 MG
1000 TABLET ORAL
Status: COMPLETED | OUTPATIENT
Start: 2025-08-13 | End: 2025-08-14

## 2025-08-13 RX ORDER — OXYCODONE HYDROCHLORIDE 5 MG/1
5 TABLET ORAL EVERY 4 HOURS PRN
Refills: 0 | Status: DISCONTINUED | OUTPATIENT
Start: 2025-08-13 | End: 2025-08-14 | Stop reason: HOSPADM

## 2025-08-13 RX ORDER — CIPROFLOXACIN 2 MG/ML
400 INJECTION, SOLUTION INTRAVENOUS EVERY 12 HOURS SCHEDULED
Status: DISCONTINUED | OUTPATIENT
Start: 2025-08-13 | End: 2025-08-14

## 2025-08-13 RX ORDER — OXYCODONE HCL 5 MG/5 ML
5 SOLUTION, ORAL ORAL EVERY 4 HOURS PRN
Refills: 0 | Status: DISCONTINUED | OUTPATIENT
Start: 2025-08-13 | End: 2025-08-13 | Stop reason: SDUPTHER

## 2025-08-13 RX ADMIN — OXYCODONE HYDROCHLORIDE 5 MG: 5 TABLET ORAL at 22:02

## 2025-08-13 RX ADMIN — SODIUM CHLORIDE, PRESERVATIVE FREE 2 ML: 5 INJECTION INTRAVENOUS at 22:41

## 2025-08-13 RX ADMIN — SODIUM CHLORIDE, POTASSIUM CHLORIDE, SODIUM LACTATE AND CALCIUM CHLORIDE 1000 ML: 600; 310; 30; 20 INJECTION, SOLUTION INTRAVENOUS at 14:36

## 2025-08-13 RX ADMIN — SODIUM CHLORIDE: 9 INJECTION, SOLUTION INTRAVENOUS at 23:07

## 2025-08-13 RX ADMIN — MORPHINE SULFATE 4 MG: 4 INJECTION INTRAVENOUS at 18:09

## 2025-08-13 RX ADMIN — MORPHINE SULFATE 4 MG: 2 INJECTION, SOLUTION INTRAMUSCULAR; INTRAVENOUS at 15:52

## 2025-08-13 RX ADMIN — MORPHINE SULFATE 4 MG: 4 INJECTION INTRAVENOUS at 14:30

## 2025-08-13 RX ADMIN — METRONIDAZOLE 500 MG: 500 INJECTION, SOLUTION INTRAVENOUS at 18:39

## 2025-08-13 RX ADMIN — SODIUM CHLORIDE 500 ML: 9 INJECTION, SOLUTION INTRAVENOUS at 23:20

## 2025-08-13 RX ADMIN — CIPROFLOXACIN 400 MG: 2 INJECTION, SOLUTION INTRAVENOUS at 21:52

## 2025-08-13 RX ADMIN — IOHEXOL 80 ML: 350 INJECTION, SOLUTION INTRAVENOUS at 15:37

## 2025-08-13 SDOH — ECONOMIC STABILITY: TRANSPORTATION INSECURITY
IN THE PAST 12 MONTHS, HAS LACK OF RELIABLE TRANSPORTATION KEPT YOU FROM MEDICAL APPOINTMENTS, MEETINGS, WORK OR FROM GETTING THINGS NEEDED FOR DAILY LIVING?: NO

## 2025-08-13 SDOH — ECONOMIC STABILITY: HOUSING INSECURITY: WHAT IS YOUR LIVING SITUATION TODAY?: CONDO

## 2025-08-13 SDOH — ECONOMIC STABILITY: HOUSING INSECURITY: WHAT IS YOUR LIVING SITUATION TODAY?: I HAVE A STEADY PLACE TO LIVE

## 2025-08-13 SDOH — HEALTH STABILITY: PHYSICAL HEALTH: DO YOU HAVE DIFFICULTY DRESSING OR BATHING?: NO

## 2025-08-13 SDOH — SOCIAL STABILITY: SOCIAL NETWORK: SUPPORT SYSTEMS: FRIENDS;FAMILY MEMBERS

## 2025-08-13 SDOH — ECONOMIC STABILITY: INCOME INSECURITY: IN THE PAST 12 MONTHS, HAS THE ELECTRIC, GAS, OIL, OR WATER COMPANY THREATENED TO SHUT OFF SERVICE IN YOUR HOME?: NO

## 2025-08-13 SDOH — ECONOMIC STABILITY: FOOD INSECURITY: WITHIN THE PAST 12 MONTHS, THE FOOD YOU BOUGHT JUST DIDN'T LAST AND YOU DIDN'T HAVE MONEY TO GET MORE.: PATIENT DECLINED

## 2025-08-13 SDOH — HEALTH STABILITY: GENERAL
BECAUSE OF A PHYSICAL, MENTAL, OR EMOTIONAL CONDITION, DO YOU HAVE SERIOUS DIFFICULTY CONCENTRATING, REMEMBERING OR MAKING DECISIONS?: NO

## 2025-08-13 SDOH — HEALTH STABILITY: PHYSICAL HEALTH: DO YOU HAVE SERIOUS DIFFICULTY WALKING OR CLIMBING STAIRS?: NO

## 2025-08-13 SDOH — HEALTH STABILITY: GENERAL: BECAUSE OF A PHYSICAL, MENTAL, OR EMOTIONAL CONDITION, DO YOU HAVE DIFFICULTY DOING ERRANDS ALONE?: NO

## 2025-08-13 SDOH — ECONOMIC STABILITY: GENERAL

## 2025-08-13 SDOH — ECONOMIC STABILITY: HOUSING INSECURITY: DO YOU HAVE PROBLEMS WITH ANY OF THE FOLLOWING?: NONE OF THE ABOVE

## 2025-08-13 SDOH — ECONOMIC STABILITY: HOUSING INSECURITY: WHAT IS YOUR LIVING SITUATION TODAY?: ALONE

## 2025-08-13 ASSESSMENT — PAIN SCALES - GENERAL
PAINLEVEL_OUTOF10: 1
PAINLEVEL_OUTOF10: 7
PAINLEVEL_OUTOF10: 10

## 2025-08-13 ASSESSMENT — COLUMBIA-SUICIDE SEVERITY RATING SCALE - C-SSRS
2. HAVE YOU ACTUALLY HAD ANY THOUGHTS OF KILLING YOURSELF?: NO
IS THE PATIENT ABLE TO COMPLETE C-SSRS: YES
2. HAVE YOU ACTUALLY HAD ANY THOUGHTS OF KILLING YOURSELF?: NO
1. WITHIN THE PAST MONTH, HAVE YOU WISHED YOU WERE DEAD OR WISHED YOU COULD GO TO SLEEP AND NOT WAKE UP?: NO
6. HAVE YOU EVER DONE ANYTHING, STARTED TO DO ANYTHING, OR PREPARED TO DO ANYTHING TO END YOUR LIFE?: NO
1. WITHIN THE PAST MONTH, HAVE YOU WISHED YOU WERE DEAD OR WISHED YOU COULD GO TO SLEEP AND NOT WAKE UP?: NO
6. HAVE YOU EVER DONE ANYTHING, STARTED TO DO ANYTHING, OR PREPARED TO DO ANYTHING TO END YOUR LIFE?: NO

## 2025-08-13 ASSESSMENT — ACTIVITIES OF DAILY LIVING (ADL)
ADL_SCORE: 12

## 2025-08-13 ASSESSMENT — PATIENT HEALTH QUESTIONNAIRE - PHQ9
CLINICAL INTERPRETATION OF PHQ2 SCORE: NO FURTHER SCREENING NEEDED
2. FEELING DOWN, DEPRESSED OR HOPELESS: NOT AT ALL
1. LITTLE INTEREST OR PLEASURE IN DOING THINGS: NOT AT ALL
SUM OF ALL RESPONSES TO PHQ9 QUESTIONS 1 AND 2: 0
IS PATIENT ABLE TO COMPLETE PHQ2 OR PHQ9: NO, DEFER TO LATER TIME
IS PATIENT ABLE TO COMPLETE PHQ2 OR PHQ9: YES
SUM OF ALL RESPONSES TO PHQ9 QUESTIONS 1 AND 2: 0

## 2025-08-14 ENCOUNTER — ANESTHESIA (OUTPATIENT)
Dept: SURGERY | Age: 30
End: 2025-08-14

## 2025-08-14 ENCOUNTER — ANESTHESIA EVENT (OUTPATIENT)
Dept: SURGERY | Age: 30
End: 2025-08-14

## 2025-08-14 VITALS
DIASTOLIC BLOOD PRESSURE: 63 MMHG | RESPIRATION RATE: 16 BRPM | TEMPERATURE: 98.4 F | HEART RATE: 55 BPM | WEIGHT: 124.12 LBS | OXYGEN SATURATION: 99 % | SYSTOLIC BLOOD PRESSURE: 97 MMHG | BODY MASS INDEX: 16.81 KG/M2 | HEIGHT: 72 IN

## 2025-08-14 PROCEDURE — A9150 MISC/EXPER NON-PRESCRIPT DRU: HCPCS

## 2025-08-14 PROCEDURE — 10002800 HB RX 250 W HCPCS: Performed by: REGISTERED NURSE

## 2025-08-14 PROCEDURE — 13000002 HB ANESTHESIA  GENERAL   S/U + 1ST 15 MIN: Performed by: SPECIALIST

## 2025-08-14 PROCEDURE — A4216 STERILE WATER/SALINE, 10 ML: HCPCS

## 2025-08-14 PROCEDURE — 10002801 HB RX 250 W/O HCPCS: Performed by: REGISTERED NURSE

## 2025-08-14 PROCEDURE — 10002803 HB RX 637: Performed by: STUDENT IN AN ORGANIZED HEALTH CARE EDUCATION/TRAINING PROGRAM

## 2025-08-14 PROCEDURE — 10002807 HB RX 258: Performed by: REGISTERED NURSE

## 2025-08-14 PROCEDURE — 10002801 HB RX 250 W/O HCPCS: Performed by: SPECIALIST

## 2025-08-14 PROCEDURE — 10004451 HB PACU RECOVERY 1ST 30 MINUTES: Performed by: SPECIALIST

## 2025-08-14 PROCEDURE — 10002800 HB RX 250 W HCPCS

## 2025-08-14 PROCEDURE — A9150 MISC/EXPER NON-PRESCRIPT DRU: HCPCS | Performed by: STUDENT IN AN ORGANIZED HEALTH CARE EDUCATION/TRAINING PROGRAM

## 2025-08-14 PROCEDURE — 13000003 HB ANESTHESIA  GENERAL EA ADD MINUTE: Performed by: SPECIALIST

## 2025-08-14 PROCEDURE — 10004651 HB RX, NO CHARGE ITEM: Performed by: STUDENT IN AN ORGANIZED HEALTH CARE EDUCATION/TRAINING PROGRAM

## 2025-08-14 PROCEDURE — 10006023 HB SUPPLY 272: Performed by: SPECIALIST

## 2025-08-14 PROCEDURE — 13000035 HB BASIC CASE EA ADD MINUTE: Performed by: SPECIALIST

## 2025-08-14 PROCEDURE — 10004651 HB RX, NO CHARGE ITEM

## 2025-08-14 PROCEDURE — 10002807 HB RX 258: Performed by: STUDENT IN AN ORGANIZED HEALTH CARE EDUCATION/TRAINING PROGRAM

## 2025-08-14 PROCEDURE — 10002800 HB RX 250 W HCPCS: Performed by: STUDENT IN AN ORGANIZED HEALTH CARE EDUCATION/TRAINING PROGRAM

## 2025-08-14 PROCEDURE — 10002801 HB RX 250 W/O HCPCS: Performed by: STUDENT IN AN ORGANIZED HEALTH CARE EDUCATION/TRAINING PROGRAM

## 2025-08-14 PROCEDURE — 13000034 HB BASIC CASE  S/U +1ST 15 MIN: Performed by: SPECIALIST

## 2025-08-14 PROCEDURE — 10004452 HB PACU ADDL 30 MINUTES: Performed by: SPECIALIST

## 2025-08-14 RX ORDER — CIPROFLOXACIN 2 MG/ML
400 INJECTION, SOLUTION INTRAVENOUS
Status: DISCONTINUED | OUTPATIENT
Start: 2025-08-14 | End: 2025-08-14 | Stop reason: HOSPADM

## 2025-08-14 RX ORDER — DEXAMETHASONE SODIUM PHOSPHATE 4 MG/ML
INJECTION, SOLUTION INTRA-ARTICULAR; INTRALESIONAL; INTRAMUSCULAR; INTRAVENOUS; SOFT TISSUE PRN
Status: DISCONTINUED | OUTPATIENT
Start: 2025-08-14 | End: 2025-08-14

## 2025-08-14 RX ORDER — CIPROFLOXACIN 500 MG/1
500 TABLET, FILM COATED ORAL 2 TIMES DAILY
Qty: 6 TABLET | Refills: 0 | Status: SHIPPED | OUTPATIENT
Start: 2025-08-15 | End: 2025-08-18

## 2025-08-14 RX ORDER — ACETAMINOPHEN 325 MG/1
650 TABLET ORAL EVERY 4 HOURS PRN
Status: DISCONTINUED | OUTPATIENT
Start: 2025-08-14 | End: 2025-08-14 | Stop reason: HOSPADM

## 2025-08-14 RX ORDER — ONDANSETRON 2 MG/ML
4 INJECTION INTRAMUSCULAR; INTRAVENOUS
Status: ACTIVE | OUTPATIENT
Start: 2025-08-14 | End: 2025-08-14

## 2025-08-14 RX ORDER — MAGNESIUM HYDROXIDE 1200 MG/15ML
LIQUID ORAL PRN
Status: DISCONTINUED | OUTPATIENT
Start: 2025-08-14 | End: 2025-08-14 | Stop reason: HOSPADM

## 2025-08-14 RX ORDER — OXYCODONE HYDROCHLORIDE 5 MG/1
5 TABLET ORAL
Refills: 0 | Status: DISCONTINUED | OUTPATIENT
Start: 2025-08-14 | End: 2025-08-14 | Stop reason: HOSPADM

## 2025-08-14 RX ORDER — LIDOCAINE HYDROCHLORIDE 10 MG/ML
INJECTION, SOLUTION INFILTRATION; PERINEURAL PRN
Status: DISCONTINUED | OUTPATIENT
Start: 2025-08-14 | End: 2025-08-14

## 2025-08-14 RX ORDER — CYCLOBENZAPRINE HCL 5 MG
5 TABLET ORAL 3 TIMES DAILY
Status: DISCONTINUED | OUTPATIENT
Start: 2025-08-14 | End: 2025-08-14 | Stop reason: HOSPADM

## 2025-08-14 RX ORDER — SODIUM CHLORIDE, SODIUM LACTATE, POTASSIUM CHLORIDE, CALCIUM CHLORIDE 600; 310; 30; 20 MG/100ML; MG/100ML; MG/100ML; MG/100ML
INJECTION, SOLUTION INTRAVENOUS CONTINUOUS
Status: DISCONTINUED | OUTPATIENT
Start: 2025-08-14 | End: 2025-08-14 | Stop reason: HOSPADM

## 2025-08-14 RX ORDER — DEXTROSE MONOHYDRATE 25 G/50ML
25 INJECTION, SOLUTION INTRAVENOUS PRN
Status: DISCONTINUED | OUTPATIENT
Start: 2025-08-14 | End: 2025-08-14 | Stop reason: HOSPADM

## 2025-08-14 RX ORDER — SODIUM CHLORIDE, SODIUM LACTATE, POTASSIUM CHLORIDE, CALCIUM CHLORIDE 600; 310; 30; 20 MG/100ML; MG/100ML; MG/100ML; MG/100ML
INJECTION, SOLUTION INTRAVENOUS CONTINUOUS PRN
Status: DISCONTINUED | OUTPATIENT
Start: 2025-08-14 | End: 2025-08-14

## 2025-08-14 RX ORDER — METRONIDAZOLE 500 MG/1
500 TABLET ORAL 3 TIMES DAILY
Qty: 9 TABLET | Refills: 0 | Status: SHIPPED | OUTPATIENT
Start: 2025-08-15 | End: 2025-08-18

## 2025-08-14 RX ORDER — ACETAMINOPHEN 650 MG/1
650 SUPPOSITORY RECTAL EVERY 4 HOURS PRN
Status: DISCONTINUED | OUTPATIENT
Start: 2025-08-14 | End: 2025-08-14 | Stop reason: HOSPADM

## 2025-08-14 RX ORDER — IBUPROFEN 200 MG
400 TABLET ORAL
Status: ACTIVE | OUTPATIENT
Start: 2025-08-14 | End: 2025-08-14

## 2025-08-14 RX ORDER — ROCURONIUM BROMIDE 10 MG/ML
INJECTION, SOLUTION INTRAVENOUS PRN
Status: DISCONTINUED | OUTPATIENT
Start: 2025-08-14 | End: 2025-08-14

## 2025-08-14 RX ORDER — NICOTINE POLACRILEX 4 MG
30 LOZENGE BUCCAL
Status: DISCONTINUED | OUTPATIENT
Start: 2025-08-14 | End: 2025-08-14 | Stop reason: HOSPADM

## 2025-08-14 RX ORDER — MIDAZOLAM HYDROCHLORIDE 1 MG/ML
INJECTION, SOLUTION INTRAMUSCULAR; INTRAVENOUS PRN
Status: DISCONTINUED | OUTPATIENT
Start: 2025-08-14 | End: 2025-08-14

## 2025-08-14 RX ORDER — ACETAMINOPHEN 500 MG
500 TABLET ORAL
Status: ACTIVE | OUTPATIENT
Start: 2025-08-14 | End: 2025-08-14

## 2025-08-14 RX ORDER — PROCHLORPERAZINE EDISYLATE 5 MG/ML
5 INJECTION INTRAMUSCULAR; INTRAVENOUS EVERY 4 HOURS PRN
Status: DISCONTINUED | OUTPATIENT
Start: 2025-08-14 | End: 2025-08-14 | Stop reason: HOSPADM

## 2025-08-14 RX ORDER — PROPOFOL 10 MG/ML
INJECTION, EMULSION INTRAVENOUS PRN
Status: DISCONTINUED | OUTPATIENT
Start: 2025-08-14 | End: 2025-08-14

## 2025-08-14 RX ORDER — NALOXONE HCL 0.4 MG/ML
0.2 VIAL (ML) INJECTION EVERY 5 MIN PRN
Status: DISCONTINUED | OUTPATIENT
Start: 2025-08-14 | End: 2025-08-14 | Stop reason: HOSPADM

## 2025-08-14 RX ORDER — METRONIDAZOLE 500 MG/1
500 TABLET ORAL 2 TIMES DAILY
Qty: 6 TABLET | Refills: 0 | Status: SHIPPED | OUTPATIENT
Start: 2025-08-15 | End: 2025-08-14

## 2025-08-14 RX ORDER — HYDROCODONE BITARTRATE AND ACETAMINOPHEN 5; 325 MG/1; MG/1
1 TABLET ORAL
Refills: 0 | Status: DISCONTINUED | OUTPATIENT
Start: 2025-08-14 | End: 2025-08-14 | Stop reason: HOSPADM

## 2025-08-14 RX ORDER — SODIUM CHLORIDE 9 MG/ML
INJECTION, SOLUTION INTRAVENOUS CONTINUOUS
Status: DISCONTINUED | OUTPATIENT
Start: 2025-08-14 | End: 2025-08-14 | Stop reason: HOSPADM

## 2025-08-14 RX ORDER — ONDANSETRON 2 MG/ML
INJECTION INTRAMUSCULAR; INTRAVENOUS PRN
Status: DISCONTINUED | OUTPATIENT
Start: 2025-08-14 | End: 2025-08-14

## 2025-08-14 RX ORDER — BUPIVACAINE HCL/EPINEPHRINE 0.5-1:200K
VIAL (ML) INJECTION PRN
Status: DISCONTINUED | OUTPATIENT
Start: 2025-08-14 | End: 2025-08-14 | Stop reason: HOSPADM

## 2025-08-14 RX ORDER — OXYCODONE HYDROCHLORIDE 5 MG/1
5 TABLET ORAL
Refills: 0 | Status: ACTIVE | OUTPATIENT
Start: 2025-08-14 | End: 2025-08-14

## 2025-08-14 RX ORDER — HYDROCODONE BITARTRATE AND ACETAMINOPHEN 5; 325 MG/1; MG/1
1 TABLET ORAL EVERY 6 HOURS PRN
Qty: 15 TABLET | Refills: 0 | Status: SHIPPED | OUTPATIENT
Start: 2025-08-14

## 2025-08-14 RX ORDER — ACETAMINOPHEN 500 MG
1000 TABLET ORAL EVERY 8 HOURS SCHEDULED
Status: DISCONTINUED | OUTPATIENT
Start: 2025-08-14 | End: 2025-08-14 | Stop reason: HOSPADM

## 2025-08-14 RX ADMIN — ROCURONIUM BROMIDE 50 MG: 10 INJECTION INTRAVENOUS at 10:55

## 2025-08-14 RX ADMIN — LIDOCAINE HYDROCHLORIDE 50 MG: 10 INJECTION, SOLUTION INFILTRATION; PERINEURAL at 10:55

## 2025-08-14 RX ADMIN — CYCLOBENZAPRINE HYDROCHLORIDE 5 MG: 5 TABLET, FILM COATED ORAL at 14:20

## 2025-08-14 RX ADMIN — SODIUM CHLORIDE, PRESERVATIVE FREE 2 ML: 5 INJECTION INTRAVENOUS at 09:05

## 2025-08-14 RX ADMIN — ACETAMINOPHEN 1000 MG: 500 TABLET ORAL at 10:25

## 2025-08-14 RX ADMIN — FENTANYL CITRATE 25 MCG: 50 INJECTION INTRAMUSCULAR; INTRAVENOUS at 12:20

## 2025-08-14 RX ADMIN — FENTANYL CITRATE 25 MCG: 50 INJECTION INTRAMUSCULAR; INTRAVENOUS at 12:10

## 2025-08-14 RX ADMIN — SODIUM CHLORIDE 500 ML: 9 INJECTION, SOLUTION INTRAVENOUS at 06:15

## 2025-08-14 RX ADMIN — MIDAZOLAM HYDROCHLORIDE 2 MG: 1 INJECTION, SOLUTION INTRAMUSCULAR; INTRAVENOUS at 10:49

## 2025-08-14 RX ADMIN — METRONIDAZOLE 500 MG: 500 INJECTION, SOLUTION INTRAVENOUS at 05:09

## 2025-08-14 RX ADMIN — CEFTRIAXONE SODIUM 2000 MG: 2 INJECTION, POWDER, FOR SOLUTION INTRAMUSCULAR; INTRAVENOUS at 09:27

## 2025-08-14 RX ADMIN — SODIUM CHLORIDE, POTASSIUM CHLORIDE, SODIUM LACTATE AND CALCIUM CHLORIDE: 600; 310; 30; 20 INJECTION, SOLUTION INTRAVENOUS at 10:47

## 2025-08-14 RX ADMIN — ONDANSETRON 4 MG: 2 INJECTION INTRAMUSCULAR; INTRAVENOUS at 11:30

## 2025-08-14 RX ADMIN — SODIUM CHLORIDE, POTASSIUM CHLORIDE, SODIUM LACTATE AND CALCIUM CHLORIDE: 600; 310; 30; 20 INJECTION, SOLUTION INTRAVENOUS at 12:24

## 2025-08-14 RX ADMIN — ACETAMINOPHEN 1000 MG: 500 TABLET ORAL at 14:20

## 2025-08-14 RX ADMIN — FENTANYL CITRATE 50 MCG: 50 INJECTION INTRAMUSCULAR; INTRAVENOUS at 11:30

## 2025-08-14 RX ADMIN — FENTANYL CITRATE 50 MCG: 50 INJECTION INTRAMUSCULAR; INTRAVENOUS at 11:19

## 2025-08-14 RX ADMIN — OXYCODONE HYDROCHLORIDE 5 MG: 5 TABLET ORAL at 13:34

## 2025-08-14 RX ADMIN — METRONIDAZOLE 500 MG: 500 INJECTION, SOLUTION INTRAVENOUS at 14:23

## 2025-08-14 RX ADMIN — FENTANYL CITRATE 100 MCG: 50 INJECTION INTRAMUSCULAR; INTRAVENOUS at 10:49

## 2025-08-14 RX ADMIN — FENTANYL CITRATE 25 MCG: 50 INJECTION INTRAMUSCULAR; INTRAVENOUS at 11:51

## 2025-08-14 RX ADMIN — DEXAMETHASONE SODIUM PHOSPHATE 8 MG: 4 INJECTION INTRA-ARTICULAR; INTRALESIONAL; INTRAMUSCULAR; INTRAVENOUS; SOFT TISSUE at 11:12

## 2025-08-14 RX ADMIN — KETOROLAC TROMETHAMINE 15 MG: 30 INJECTION, SOLUTION INTRAMUSCULAR at 11:34

## 2025-08-14 RX ADMIN — FENTANYL CITRATE 25 MCG: 50 INJECTION INTRAMUSCULAR; INTRAVENOUS at 12:15

## 2025-08-14 RX ADMIN — PROPOFOL 200 MG: 10 INJECTION, EMULSION INTRAVENOUS at 10:55

## 2025-08-14 RX ADMIN — SUGAMMADEX 200 MG: 100 INJECTION, SOLUTION INTRAVENOUS at 11:34

## 2025-08-14 ASSESSMENT — PAIN SCALES - GENERAL
PAINLEVEL_OUTOF10: 7
PAINLEVEL_OUTOF10: 5
PAINLEVEL_OUTOF10: 4
PAINLEVEL_OUTOF10: 6
PAINLEVEL_OUTOF10: 7
PAINLEVEL_OUTOF10: 3
PAINLEVEL_OUTOF10: 0
PAINLEVEL_OUTOF10: 6
PAINLEVEL_OUTOF10: 3

## 2025-08-17 LAB
BACTERIA BLD CULT: NORMAL
BACTERIA BLD CULT: NORMAL

## 2025-08-18 LAB
BACTERIA BLD CULT: NORMAL
BACTERIA BLD CULT: NORMAL

## 2025-08-25 ENCOUNTER — CASE MANAGEMENT (OUTPATIENT)
Dept: CARE COORDINATION | Age: 30
End: 2025-08-25

## 2025-09-03 ENCOUNTER — CASE MANAGEMENT (OUTPATIENT)
Dept: CARE COORDINATION | Age: 30
End: 2025-09-03

## (undated) DEVICE — SPONGE GAUZE L4 IN X W4 IN 12 PLY WOVEN FOLD EDGE XRAY

## (undated) DEVICE — ELECTRODE ESURG BLADE PENCIL L10 FT OD38 IN PLUMEPEN ELITE

## (undated) DEVICE — BLANKET WARMING L74 IN X W24 IN BAIR HGGR PLMR ADULT UPR

## (undated) DEVICE — BLADE SURG 11 UNFRM SHARPNESS STRL SS

## (undated) DEVICE — GLOVE SURG 8 GAMMEX LF GRN CHMO PF ANSLP SMTH MIC TXTR STRL

## (undated) DEVICE — GLOVE SURG 7.5 PROTEXIS PI PWDR FREE BEAD CUFF PLISPRN L11.8

## (undated) DEVICE — LOOP VSL MINI ELIP 406X.8MM RED LF 2 POUCH RADOPQ STERION

## (undated) DEVICE — APPLICATOR BNZN TNCT .6ML STRSTRP SKNCLS NONHYPOALLERGENIC

## (undated) DEVICE — SOLUTION PREP 2OZ 10% PVP IOD SPRAY APLICARE STRL LF

## (undated) DEVICE — GLOVE SURG 6.5 PROTEXIS PI PWDR FREE BEAD CUFF PLISPRN L11.3

## (undated) DEVICE — SUTURE SILK PERMA HAND BLK 0 L30 IN BRAID TIES 6 STRN PCUT

## (undated) DEVICE — Device

## (undated) DEVICE — CRADLE PSTN DEVON FOAM ARM LMI

## (undated) DEVICE — ELECTRODE PT RTN L9 FT VALLEYLAB REM POLYHESIVE ACRYLIC FOAM

## (undated) DEVICE — SUTURE COAT VICRYL 2-0 SH L27 IN BRAID COAT VIOL ABS

## (undated) DEVICE — PILLOW PSTN 7IN GENTLETOUCH HEAD CONTOURETHANE